# Patient Record
Sex: FEMALE | Race: BLACK OR AFRICAN AMERICAN | NOT HISPANIC OR LATINO | Employment: FULL TIME | URBAN - METROPOLITAN AREA
[De-identification: names, ages, dates, MRNs, and addresses within clinical notes are randomized per-mention and may not be internally consistent; named-entity substitution may affect disease eponyms.]

---

## 2024-02-29 ENCOUNTER — APPOINTMENT (OUTPATIENT)
Dept: RADIOLOGY | Facility: HOSPITAL | Age: 39
End: 2024-02-29
Payer: COMMERCIAL

## 2024-02-29 ENCOUNTER — HOSPITAL ENCOUNTER (EMERGENCY)
Facility: HOSPITAL | Age: 39
Discharge: HOME | End: 2024-02-29
Payer: COMMERCIAL

## 2024-02-29 VITALS
HEART RATE: 94 BPM | OXYGEN SATURATION: 97 % | TEMPERATURE: 97.3 F | BODY MASS INDEX: 33.32 KG/M2 | SYSTOLIC BLOOD PRESSURE: 120 MMHG | RESPIRATION RATE: 16 BRPM | HEIGHT: 65 IN | WEIGHT: 200 LBS | DIASTOLIC BLOOD PRESSURE: 89 MMHG

## 2024-02-29 DIAGNOSIS — J10.1 INFLUENZA A: Primary | ICD-10-CM

## 2024-02-29 LAB
ALBUMIN SERPL BCP-MCNC: 4.4 G/DL (ref 3.4–5)
ALP SERPL-CCNC: 72 U/L (ref 33–110)
ALT SERPL W P-5'-P-CCNC: 12 U/L (ref 7–45)
ANION GAP SERPL CALC-SCNC: 14 MMOL/L (ref 10–20)
AST SERPL W P-5'-P-CCNC: 19 U/L (ref 9–39)
BASOPHILS # BLD AUTO: 0.04 X10*3/UL (ref 0–0.1)
BASOPHILS NFR BLD AUTO: 0.7 %
BILIRUB SERPL-MCNC: 0.4 MG/DL (ref 0–1.2)
BUN SERPL-MCNC: 13 MG/DL (ref 6–23)
CALCIUM SERPL-MCNC: 9.7 MG/DL (ref 8.6–10.6)
CHLORIDE SERPL-SCNC: 99 MMOL/L (ref 98–107)
CO2 SERPL-SCNC: 26 MMOL/L (ref 21–32)
CREAT SERPL-MCNC: 0.97 MG/DL (ref 0.5–1.05)
EGFRCR SERPLBLD CKD-EPI 2021: 76 ML/MIN/1.73M*2
EOSINOPHIL # BLD AUTO: 0.12 X10*3/UL (ref 0–0.7)
EOSINOPHIL NFR BLD AUTO: 2.1 %
ERYTHROCYTE [DISTWIDTH] IN BLOOD BY AUTOMATED COUNT: 13.2 % (ref 11.5–14.5)
FLUAV RNA RESP QL NAA+PROBE: DETECTED
FLUBV RNA RESP QL NAA+PROBE: NOT DETECTED
GLUCOSE SERPL-MCNC: 141 MG/DL (ref 74–99)
HCT VFR BLD AUTO: 44 % (ref 36–46)
HGB BLD-MCNC: 14.9 G/DL (ref 12–16)
IMM GRANULOCYTES # BLD AUTO: 0.02 X10*3/UL (ref 0–0.7)
IMM GRANULOCYTES NFR BLD AUTO: 0.3 % (ref 0–0.9)
LYMPHOCYTES # BLD AUTO: 0.78 X10*3/UL (ref 1.2–4.8)
LYMPHOCYTES NFR BLD AUTO: 13.5 %
MCH RBC QN AUTO: 29.4 PG (ref 26–34)
MCHC RBC AUTO-ENTMCNC: 33.9 G/DL (ref 32–36)
MCV RBC AUTO: 87 FL (ref 80–100)
MONOCYTES # BLD AUTO: 0.86 X10*3/UL (ref 0.1–1)
MONOCYTES NFR BLD AUTO: 14.9 %
NEUTROPHILS # BLD AUTO: 3.97 X10*3/UL (ref 1.2–7.7)
NEUTROPHILS NFR BLD AUTO: 68.5 %
NRBC BLD-RTO: 0 /100 WBCS (ref 0–0)
PLATELET # BLD AUTO: 192 X10*3/UL (ref 150–450)
POTASSIUM SERPL-SCNC: 4 MMOL/L (ref 3.5–5.3)
PREGNANCY TEST URINE, POC: NEGATIVE
PROT SERPL-MCNC: 8.2 G/DL (ref 6.4–8.2)
RBC # BLD AUTO: 5.06 X10*6/UL (ref 4–5.2)
S PYO DNA THROAT QL NAA+PROBE: NOT DETECTED
SARS-COV-2 RNA RESP QL NAA+PROBE: NOT DETECTED
SODIUM SERPL-SCNC: 135 MMOL/L (ref 136–145)
WBC # BLD AUTO: 5.8 X10*3/UL (ref 4.4–11.3)

## 2024-02-29 PROCEDURE — 81025 URINE PREGNANCY TEST: CPT

## 2024-02-29 PROCEDURE — 2500000004 HC RX 250 GENERAL PHARMACY W/ HCPCS (ALT 636 FOR OP/ED)

## 2024-02-29 PROCEDURE — 80053 COMPREHEN METABOLIC PANEL: CPT

## 2024-02-29 PROCEDURE — 96361 HYDRATE IV INFUSION ADD-ON: CPT

## 2024-02-29 PROCEDURE — 71046 X-RAY EXAM CHEST 2 VIEWS: CPT | Performed by: RADIOLOGY

## 2024-02-29 PROCEDURE — 71046 X-RAY EXAM CHEST 2 VIEWS: CPT

## 2024-02-29 PROCEDURE — 87636 SARSCOV2 & INF A&B AMP PRB: CPT

## 2024-02-29 PROCEDURE — 85025 COMPLETE CBC W/AUTO DIFF WBC: CPT

## 2024-02-29 PROCEDURE — 96374 THER/PROPH/DIAG INJ IV PUSH: CPT

## 2024-02-29 PROCEDURE — 99284 EMERGENCY DEPT VISIT MOD MDM: CPT

## 2024-02-29 PROCEDURE — 87651 STREP A DNA AMP PROBE: CPT

## 2024-02-29 PROCEDURE — 36415 COLL VENOUS BLD VENIPUNCTURE: CPT

## 2024-02-29 RX ORDER — KETOROLAC TROMETHAMINE 15 MG/ML
INJECTION, SOLUTION INTRAMUSCULAR; INTRAVENOUS
Status: COMPLETED
Start: 2024-02-29 | End: 2024-02-29

## 2024-02-29 RX ORDER — KETOROLAC TROMETHAMINE 15 MG/ML
15 INJECTION, SOLUTION INTRAMUSCULAR; INTRAVENOUS ONCE
Status: COMPLETED | OUTPATIENT
Start: 2024-02-29 | End: 2024-02-29

## 2024-02-29 RX ORDER — OSELTAMIVIR PHOSPHATE 75 MG/1
75 CAPSULE ORAL EVERY 12 HOURS
Qty: 10 CAPSULE | Refills: 0 | Status: SHIPPED | OUTPATIENT
Start: 2024-02-29 | End: 2024-03-05

## 2024-02-29 RX ADMIN — KETOROLAC TROMETHAMINE 15 MG: 15 INJECTION, SOLUTION INTRAMUSCULAR; INTRAVENOUS at 09:17

## 2024-02-29 RX ADMIN — SODIUM CHLORIDE 1000 ML: 9 INJECTION, SOLUTION INTRAVENOUS at 09:23

## 2024-02-29 ASSESSMENT — PAIN DESCRIPTION - LOCATION: LOCATION: GENERALIZED

## 2024-02-29 ASSESSMENT — COLUMBIA-SUICIDE SEVERITY RATING SCALE - C-SSRS
6. HAVE YOU EVER DONE ANYTHING, STARTED TO DO ANYTHING, OR PREPARED TO DO ANYTHING TO END YOUR LIFE?: NO
2. HAVE YOU ACTUALLY HAD ANY THOUGHTS OF KILLING YOURSELF?: NO
1. IN THE PAST MONTH, HAVE YOU WISHED YOU WERE DEAD OR WISHED YOU COULD GO TO SLEEP AND NOT WAKE UP?: NO

## 2024-02-29 ASSESSMENT — PAIN - FUNCTIONAL ASSESSMENT: PAIN_FUNCTIONAL_ASSESSMENT: 0-10

## 2024-02-29 ASSESSMENT — PAIN SCALES - GENERAL: PAINLEVEL_OUTOF10: 8

## 2024-02-29 NOTE — DISCHARGE INSTRUCTIONS
You have the flu. I am sending in a prescription for you to help treat the flu. Please drink plenty of fluids as the flu can dehydrate you. Your chest Xray was negative and strep swab was negative. Your blood work we took is showing no signs of dehydration yet.

## 2024-02-29 NOTE — Clinical Note
Silvia Tamayo was seen and treated in our emergency department on 2/29/2024.  She may return to work on 03/04/2024.       If you have any questions or concerns, please don't hesitate to call.      Manuela Cheek PA-C

## 2024-02-29 NOTE — ED PROVIDER NOTES
HPI   Chief Complaint   Patient presents with    Generalized Body Aches       38-year-old female presenting today for flulike symptoms.  For the past few days, experiencing symptoms including headache, productive cough, body aches.  Endorsing some facial congestion.  No ear pain but notes a sore throat.  Endorsing a productive cough but no shortness of breath or chest pain.  No nausea, vomiting, abdominal pain or diarrhea.  No sick contacts.  Reports the body aches are rated an 8 out of 10 on pain scale.                          [place] Coma Scale Score: 15                     Patient History   History reviewed. No pertinent past medical history.  History reviewed. No pertinent surgical history.  No family history on file.  Social History     Tobacco Use    Smoking status: Never    Smokeless tobacco: Never   Substance Use Topics    Alcohol use: Not on file    Drug use: Not on file       Physical Exam   ED Triage Vitals [02/29/24 0823]   Temperature Heart Rate Respirations BP   36.3 °C (97.3 °F) (!) 122 18 120/89      Pulse Ox Temp src Heart Rate Source Patient Position   95 % -- -- --      BP Location FiO2 (%)     -- --       Physical Exam  Vitals and nursing note reviewed.   Constitutional:       General: She is not in acute distress.     Appearance: Normal appearance. She is not ill-appearing.   HENT:      Head: Normocephalic and atraumatic.      Right Ear: Hearing, tympanic membrane, ear canal and external ear normal. Tympanic membrane is not erythematous or bulging.      Left Ear: Hearing, tympanic membrane, ear canal and external ear normal. Tympanic membrane is not erythematous or bulging.      Nose: Nose normal. No congestion or rhinorrhea.      Right Turbinates: Not swollen or pale.      Left Turbinates: Not swollen or pale.      Right Sinus: No maxillary sinus tenderness or frontal sinus tenderness.      Left Sinus: No maxillary sinus tenderness or frontal sinus tenderness.      Mouth/Throat:      Mouth:  Mucous membranes are moist.      Pharynx: Oropharynx is clear. Uvula midline. No pharyngeal swelling, oropharyngeal exudate, posterior oropharyngeal erythema or uvula swelling.      Tonsils: No tonsillar exudate or tonsillar abscesses. 0 on the right. 0 on the left.   Eyes:      Extraocular Movements: Extraocular movements intact.      Conjunctiva/sclera: Conjunctivae normal.      Pupils: Pupils are equal, round, and reactive to light.   Cardiovascular:      Rate and Rhythm: Normal rate and regular rhythm.      Heart sounds: Normal heart sounds.   Pulmonary:      Effort: No accessory muscle usage or respiratory distress.      Breath sounds: Normal breath sounds. No wheezing, rhonchi or rales.   Abdominal:      General: Abdomen is flat. Bowel sounds are normal. There is no distension.      Palpations: Abdomen is soft.      Tenderness: There is no abdominal tenderness. There is no right CVA tenderness or left CVA tenderness.   Musculoskeletal:         General: No swelling or deformity. Normal range of motion.      Cervical back: Normal range of motion and neck supple.      Right lower leg: No edema.      Left lower leg: No edema.   Skin:     General: Skin is warm and dry.      Capillary Refill: Capillary refill takes less than 2 seconds.   Neurological:      General: No focal deficit present.      Mental Status: She is alert and oriented to person, place, and time.      GCS: GCS eye subscore is 4. GCS verbal subscore is 5. GCS motor subscore is 6.      Cranial Nerves: Cranial nerves 2-12 are intact.      Sensory: No sensory deficit.      Motor: Motor function is intact. No weakness.   Psychiatric:         Mood and Affect: Mood and affect normal.         Speech: Speech normal.         Behavior: Behavior normal. Behavior is cooperative.         ED Course & MDM   ED Course as of 02/29/24 1102   Thu Feb 29, 2024   0920 Preg Test, Ur: Negative [ML]   0955 Flu A Result(!): Detected [ML]   0956 XR chest 2 views  1. Blunting  of the right costophrenic angle which may represent small  pleural effusion. Correlate with risk factors.  2. No focal pulmonary consolidation. [ML]   1014 Comprehensive metabolic panel(!)  Mildly decreased sodium at 135.  No other electrolyte abnormalities, RONNELL or elevated LFTs [ML]   1041 Group A Strep PCR: Not Detected [ML]   1041 CBC and Auto Differential(!)  No leukocytosis or acute anemia [ML]      ED Course User Index  [ML] Manuela Cheek PA-C         Diagnoses as of 02/29/24 1102   Influenza A       Medical Decision Making  38-year-old female presenting today for flulike symptoms.  HEENT exam benign.  Mildly erythematous oropharynx without any notable exudates.  I did proceed and get a strep swab which came back negative.  TMs bilaterally without any erythema or bulging.  Lungs clear to auscultation without any adventitious lung sounds.  Considering her productive cough with the notable mild hypoxia of 95%, I did get a chest x-ray which shows no signs of pneumonia, though a very small right-sided pleural effusion.  Will defer antibiotic treatment at this time as I have low concerns for pneumonia.  Came back positive for flu A.  Patient was given Toradol and IV fluids as when she came in she had a heart rate of 122, could be due to dehydration.  It did resolve after patient was given IV fluids and heart rate went down to 94.  No fever noted.  No signs of SIRS/sepsis.  I also got CBC and CMP which came back negative.  Patient feels much better, and home-going.  Instructed patient on symptomatic control.  Considering the notable small pleural effusion, will send her home with Tamiflu in order to prevent a viral pneumonia.  Instructed to follow-up with PCP and come back to the ER if symptoms worsen.        Procedure  Procedures     Manuela Cheek PA-C  02/29/24 1117       Manuela Cheek PA-C  02/29/24 1118

## 2024-08-16 ENCOUNTER — CLINICAL SUPPORT (OUTPATIENT)
Dept: EMERGENCY MEDICINE | Facility: HOSPITAL | Age: 39
End: 2024-08-16
Payer: MEDICAID

## 2024-08-16 ENCOUNTER — APPOINTMENT (OUTPATIENT)
Dept: RADIOLOGY | Facility: HOSPITAL | Age: 39
End: 2024-08-16
Payer: MEDICAID

## 2024-08-16 ENCOUNTER — HOSPITAL ENCOUNTER (EMERGENCY)
Facility: HOSPITAL | Age: 39
Discharge: HOME | End: 2024-08-16
Payer: MEDICAID

## 2024-08-16 VITALS
DIASTOLIC BLOOD PRESSURE: 89 MMHG | BODY MASS INDEX: 33.32 KG/M2 | WEIGHT: 200 LBS | OXYGEN SATURATION: 98 % | HEART RATE: 74 BPM | TEMPERATURE: 97.6 F | HEIGHT: 65 IN | SYSTOLIC BLOOD PRESSURE: 140 MMHG | RESPIRATION RATE: 16 BRPM

## 2024-08-16 DIAGNOSIS — M25.512 ACUTE PAIN OF LEFT SHOULDER: Primary | ICD-10-CM

## 2024-08-16 LAB
ATRIAL RATE: 74 BPM
P AXIS: 80 DEGREES
P OFFSET: 215 MS
P ONSET: 164 MS
PR INTERVAL: 120 MS
Q ONSET: 224 MS
QRS COUNT: 12 BEATS
QRS DURATION: 68 MS
QT INTERVAL: 354 MS
QTC CALCULATION(BAZETT): 392 MS
QTC FREDERICIA: 379 MS
R AXIS: 62 DEGREES
T AXIS: 33 DEGREES
T OFFSET: 401 MS
VENTRICULAR RATE: 74 BPM

## 2024-08-16 PROCEDURE — 73030 X-RAY EXAM OF SHOULDER: CPT | Mod: LT

## 2024-08-16 PROCEDURE — 93010 ELECTROCARDIOGRAM REPORT: CPT

## 2024-08-16 PROCEDURE — 2500000001 HC RX 250 WO HCPCS SELF ADMINISTERED DRUGS (ALT 637 FOR MEDICARE OP): Mod: SE

## 2024-08-16 PROCEDURE — 99284 EMERGENCY DEPT VISIT MOD MDM: CPT

## 2024-08-16 PROCEDURE — 2500000005 HC RX 250 GENERAL PHARMACY W/O HCPCS: Mod: SE

## 2024-08-16 PROCEDURE — 73060 X-RAY EXAM OF HUMERUS: CPT | Mod: LEFT SIDE | Performed by: STUDENT IN AN ORGANIZED HEALTH CARE EDUCATION/TRAINING PROGRAM

## 2024-08-16 PROCEDURE — 73030 X-RAY EXAM OF SHOULDER: CPT | Mod: LEFT SIDE | Performed by: STUDENT IN AN ORGANIZED HEALTH CARE EDUCATION/TRAINING PROGRAM

## 2024-08-16 PROCEDURE — 93005 ELECTROCARDIOGRAM TRACING: CPT

## 2024-08-16 PROCEDURE — 73060 X-RAY EXAM OF HUMERUS: CPT | Mod: LT

## 2024-08-16 RX ORDER — LIDOCAINE 560 MG/1
1 PATCH PERCUTANEOUS; TOPICAL; TRANSDERMAL ONCE
Status: DISCONTINUED | OUTPATIENT
Start: 2024-08-16 | End: 2024-08-16 | Stop reason: HOSPADM

## 2024-08-16 RX ORDER — CYCLOBENZAPRINE HCL 10 MG
5 TABLET ORAL ONCE
Status: COMPLETED | OUTPATIENT
Start: 2024-08-16 | End: 2024-08-16

## 2024-08-16 RX ORDER — ACETAMINOPHEN 500 MG
500 TABLET ORAL EVERY 6 HOURS PRN
Qty: 28 TABLET | Refills: 0 | Status: SHIPPED | OUTPATIENT
Start: 2024-08-16 | End: 2024-08-23

## 2024-08-16 RX ORDER — LIDOCAINE 50 MG/G
1 PATCH TOPICAL DAILY
Qty: 5 PATCH | Refills: 0 | Status: SHIPPED | OUTPATIENT
Start: 2024-08-16

## 2024-08-16 RX ORDER — CYCLOBENZAPRINE HCL 5 MG
5 TABLET ORAL 3 TIMES DAILY PRN
Qty: 21 TABLET | Refills: 0 | Status: SHIPPED | OUTPATIENT
Start: 2024-08-16 | End: 2024-08-23

## 2024-08-16 RX ORDER — ACETAMINOPHEN 325 MG/1
975 TABLET ORAL ONCE
Status: COMPLETED | OUTPATIENT
Start: 2024-08-16 | End: 2024-08-16

## 2024-08-16 ASSESSMENT — COLUMBIA-SUICIDE SEVERITY RATING SCALE - C-SSRS
6. HAVE YOU EVER DONE ANYTHING, STARTED TO DO ANYTHING, OR PREPARED TO DO ANYTHING TO END YOUR LIFE?: NO
1. IN THE PAST MONTH, HAVE YOU WISHED YOU WERE DEAD OR WISHED YOU COULD GO TO SLEEP AND NOT WAKE UP?: NO
2. HAVE YOU ACTUALLY HAD ANY THOUGHTS OF KILLING YOURSELF?: NO

## 2024-08-16 NOTE — ED PROVIDER NOTES
HPI   Chief Complaint   Patient presents with    Shoulder Pain    Neck Pain   This is a 39-year-old female who denies any significant past medical history who presents to the ED with shoulder pain. 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  She has been experiencing throbbing pains in her left shoulder that radiate into the back of her upper arm, rated 9/10.  Denies any aggravating or alleviating factors.  She states that her symptoms began 2 days ago after she came home from work. No elbow or wrist involvement.  No falls or direct blows.  She has not taken anything OTC for her symptoms.     Denies any fevers, chills, chest pain, shortness of breath, abdominal pain, N/V/D    Limitations to history: None  Independent Historians: Patient  External Records Reviewed: None    Patient History   History reviewed. No pertinent past medical history.  History reviewed. No pertinent surgical history.  No family history on file.  Social History     Tobacco Use    Smoking status: Never    Smokeless tobacco: Never   Substance Use Topics    Alcohol use: Not on file    Drug use: Not on file       Physical Exam   ED Triage Vitals [08/16/24 1002]   Temperature Heart Rate Respirations BP   36.4 °C (97.6 °F) 74 16 140/89      Pulse Ox Temp Source Heart Rate Source Patient Position   98 % Temporal -- --      BP Location FiO2 (%)     -- --       Physical Exam  Constitutional:       General: She is not in acute distress.     Appearance: She is not ill-appearing.   Cardiovascular:      Rate and Rhythm: Normal rate and regular rhythm.      Pulses:           Radial pulses are 2+ on the right side and 2+ on the left side.      Heart sounds: Normal heart sounds.   Pulmonary:      Effort: Pulmonary effort is normal. No respiratory distress.      Breath sounds: Normal breath sounds.   Abdominal:      General: Bowel sounds are normal.      Palpations: Abdomen is soft.      Tenderness:  There is no abdominal tenderness.   Musculoskeletal:         General: No deformity or signs of injury.      Right shoulder: Normal.      Left shoulder: No swelling, deformity, effusion or bony tenderness. Normal range of motion.      Cervical back: Normal range of motion and neck supple. No pain with movement or spinous process tenderness.      Thoracic back: No bony tenderness. Normal range of motion.      Lumbar back: No bony tenderness. Normal range of motion.   Skin:     General: Skin is warm and dry.      Capillary Refill: Capillary refill takes less than 2 seconds.      Findings: No bruising or erythema.   Neurological:      General: No focal deficit present.      Mental Status: She is alert and oriented to person, place, and time.      Gait: Gait normal.       ED Course & MDM   ED Course as of 08/16/24 1441   Fri Aug 16, 2024   1003 ECG 12 lead  EKG shows HR 74 with sinus rhythm, normal axis, NV interval 120, QRS duration 68.   Normal ST and T wave pattern with no evidence of STEMI or acute ischemia  [LH]      ED Course User Index  [LH] Charu Gomez PA-C         Diagnoses as of 08/16/24 1441   Acute pain of left shoulder       Medical Decision Making  This is a 39-year-old female who denies any significant past medical history who presents to the ED with shoulder pain. 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  She has been experiencing throbbing pains in her left shoulder that radiate into the back of her upper arm, rated 9/10.    On physical exam, patient is sitting up comfortably and is overall well-appearing.  She endorses generalized tenderness in her left shoulder, no swelling, effusions, or bony tenderness.  ROM is intact and nonpainful in the left shoulder, elbow and wrist.  Overlying skin is warm and appears well-perfused, no erythema or ecchymosis. Radial pulses are strong with normal cap refill in all digits. Patient is freely moving  all extremities.     Administered Tylenol Flexeril and a lidocaine patch for pain. EKG shows no evidence of STEMI or acute ischemia, she denies any significant risk factors, reassuring for no ACS. No evidence of acute infection or neurovascular compromise based on benign physical exam findings.  X-rays of the shoulder and humerus showed no acute fractures or misalignments.  On reassessment, patient states that her pain has slightly improved.  Provided prescriptions for analgesics and muscle relaxants.  Counseled patient to follow-up with her PCP.  Discussed ED return criteria.  Patient verbalized understanding and was agreeable to plan of care.  Discharged in stable condition.       XR shoulder left 2+ views   Final Result   Unremarkable radiographs of the left shoulder and humerus.        MACRO:   None        Signed by: Sheryl Gil 8/16/2024 3:16 PM   Dictation workstation:   [id number]      XR humerus left   Final Result   Unremarkable radiographs of the left shoulder and humerus.        MACRO:   None        Signed by: Sheryl Gil 8/16/2024 3:16 PM   Dictation workstation:   [id number]            Charu Gomez PA-C  08/16/24 5603

## 2024-08-16 NOTE — Clinical Note
Silvia Tamayo was seen and treated in our emergency department on 8/16/2024.  She may return to work on 08/19/2024.       If you have any questions or concerns, please don't hesitate to call.      Charu Gomez PA-C

## 2024-08-16 NOTE — ED TRIAGE NOTES
Pt presents to the ED c/o neck and L shoulder pain that radiates to her elbow. 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 Denies pmhx.

## 2024-09-28 ENCOUNTER — HOSPITAL ENCOUNTER (EMERGENCY)
Facility: HOSPITAL | Age: 39
Discharge: HOME | End: 2024-09-28
Payer: MEDICAID

## 2024-09-28 VITALS
WEIGHT: 200 LBS | BODY MASS INDEX: 33.32 KG/M2 | HEART RATE: 83 BPM | OXYGEN SATURATION: 95 % | DIASTOLIC BLOOD PRESSURE: 96 MMHG | TEMPERATURE: 97.7 F | HEIGHT: 65 IN | SYSTOLIC BLOOD PRESSURE: 149 MMHG | RESPIRATION RATE: 16 BRPM

## 2024-09-28 DIAGNOSIS — J06.9 VIRAL URI WITH COUGH: Primary | ICD-10-CM

## 2024-09-28 LAB
FLUAV RNA RESP QL NAA+PROBE: NOT DETECTED
FLUBV RNA RESP QL NAA+PROBE: NOT DETECTED
PREGNANCY TEST URINE, POC: NEGATIVE
SARS-COV-2 RNA RESP QL NAA+PROBE: NOT DETECTED

## 2024-09-28 PROCEDURE — 2500000004 HC RX 250 GENERAL PHARMACY W/ HCPCS (ALT 636 FOR OP/ED): Mod: SE

## 2024-09-28 PROCEDURE — 87636 SARSCOV2 & INF A&B AMP PRB: CPT

## 2024-09-28 PROCEDURE — 96372 THER/PROPH/DIAG INJ SC/IM: CPT

## 2024-09-28 PROCEDURE — 81025 URINE PREGNANCY TEST: CPT

## 2024-09-28 PROCEDURE — 99283 EMERGENCY DEPT VISIT LOW MDM: CPT

## 2024-09-28 PROCEDURE — 99284 EMERGENCY DEPT VISIT MOD MDM: CPT

## 2024-09-28 RX ORDER — KETOROLAC TROMETHAMINE 30 MG/ML
30 INJECTION, SOLUTION INTRAMUSCULAR; INTRAVENOUS ONCE
Status: COMPLETED | OUTPATIENT
Start: 2024-09-28 | End: 2024-09-28

## 2024-09-28 RX ADMIN — KETOROLAC TROMETHAMINE 30 MG: 30 INJECTION, SOLUTION INTRAMUSCULAR; INTRAVENOUS at 08:17

## 2024-09-28 ASSESSMENT — PAIN SCALES - GENERAL
PAINLEVEL_OUTOF10: 6
PAINLEVEL_OUTOF10: 9
PAINLEVEL_OUTOF10: 0 - NO PAIN

## 2024-09-28 ASSESSMENT — COLUMBIA-SUICIDE SEVERITY RATING SCALE - C-SSRS
2. HAVE YOU ACTUALLY HAD ANY THOUGHTS OF KILLING YOURSELF?: NO
6. HAVE YOU EVER DONE ANYTHING, STARTED TO DO ANYTHING, OR PREPARED TO DO ANYTHING TO END YOUR LIFE?: NO
1. IN THE PAST MONTH, HAVE YOU WISHED YOU WERE DEAD OR WISHED YOU COULD GO TO SLEEP AND NOT WAKE UP?: NO

## 2024-09-28 ASSESSMENT — PAIN DESCRIPTION - LOCATION: LOCATION: HEAD

## 2024-09-28 ASSESSMENT — PAIN - FUNCTIONAL ASSESSMENT: PAIN_FUNCTIONAL_ASSESSMENT: 0-10

## 2024-09-28 NOTE — DISCHARGE INSTRUCTIONS
COVID and flu are negative.  Likely viral illness.  Symptomatic control with fluids, ibuprofen or Tylenol for body aches/fever.  Follow-up with primary care if symptoms worsen.

## 2024-09-28 NOTE — ED PROVIDER NOTES
HPI   Chief Complaint   Patient presents with    Flu Symptoms       39-year-old female with no significant past medical history presenting today for flulike symptoms.  Reports that symptoms started yesterday.  Endorses a headache, nonproductive cough, body aches and mild nasal congestion.  Denies ear pain though does admit to a mild sore throat.  Having no difficulty controlling secretions.  Denies any chest pain or shortness of breath.  Denies any GI symptoms including nausea vomiting diarrhea or abdominal pain.  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  Reports body aches but no documented fever.  Has not taken any medication for symptoms at home.              Patient History   History reviewed. No pertinent past medical history.  History reviewed. No pertinent surgical history.  No family history on file.  Social History     Tobacco Use    Smoking status: Never    Smokeless tobacco: Never   Substance Use Topics    Alcohol use: Not on file    Drug use: Not on file       Physical Exam   ED Triage Vitals [09/28/24 0745]   Temperature Heart Rate Respirations BP   36.5 °C (97.7 °F) 83 16 (!) 149/96      Pulse Ox Temp src Heart Rate Source Patient Position   95 % -- -- --      BP Location FiO2 (%)     -- --       Physical Exam  Vitals and nursing note reviewed.   Constitutional:       General: She is not in acute distress.     Appearance: Normal appearance. She is not ill-appearing.   HENT:      Head: Normocephalic and atraumatic.      Right Ear: Hearing, tympanic membrane, ear canal and external ear normal. Tympanic membrane is not erythematous or bulging.      Left Ear: Hearing, tympanic membrane, ear canal and external ear normal. Tympanic membrane is not erythematous or bulging.      Nose: Nose normal. No congestion or rhinorrhea.      Right Turbinates: Not swollen or pale.      Left Turbinates: Not swollen or pale.      Right Sinus: No maxillary sinus  tenderness or frontal sinus tenderness.      Left Sinus: No maxillary sinus tenderness or frontal sinus tenderness.      Mouth/Throat:      Mouth: Mucous membranes are moist.      Pharynx: Oropharynx is clear. Uvula midline. No pharyngeal swelling, oropharyngeal exudate, posterior oropharyngeal erythema or uvula swelling.      Tonsils: No tonsillar exudate or tonsillar abscesses. 0 on the right. 0 on the left.   Eyes:      Extraocular Movements: Extraocular movements intact.      Conjunctiva/sclera: Conjunctivae normal.      Pupils: Pupils are equal, round, and reactive to light.   Cardiovascular:      Rate and Rhythm: Normal rate and regular rhythm.      Heart sounds: Normal heart sounds.   Pulmonary:      Effort: No accessory muscle usage or respiratory distress.      Breath sounds: Normal breath sounds. No wheezing, rhonchi or rales.   Abdominal:      General: Abdomen is flat. Bowel sounds are normal. There is no distension.      Palpations: Abdomen is soft.      Tenderness: There is no abdominal tenderness. There is no right CVA tenderness or left CVA tenderness.   Musculoskeletal:         General: No swelling or deformity. Normal range of motion.      Cervical back: Normal range of motion and neck supple.      Right lower leg: No edema.      Left lower leg: No edema.   Skin:     General: Skin is warm and dry.      Capillary Refill: Capillary refill takes less than 2 seconds.   Neurological:      General: No focal deficit present.      Mental Status: She is alert and oriented to person, place, and time.      GCS: GCS eye subscore is 4. GCS verbal subscore is 5. GCS motor subscore is 6.      Cranial Nerves: Cranial nerves 2-12 are intact.      Sensory: No sensory deficit.      Motor: Motor function is intact. No weakness.   Psychiatric:         Mood and Affect: Mood and affect normal.         Speech: Speech normal.         Behavior: Behavior normal. Behavior is cooperative.           ED Course & MDM   ED Course  as of 09/28/24 0959   Sat Sep 28, 2024   0826 Preg Test, Ur: Negative [ML]   0916 Sars-CoV-2 PCR [ML]   0916 Influenza A, and B PCR [ML]      ED Course User Index  [ML] Manuela Cheek PA-C         Diagnoses as of 09/28/24 0959   Viral URI with cough                 No data recorded     [place] Coma Scale Score: 15 (09/28/24 0747 : Bryson Biswas RN)                           Medical Decision Making  39-year-old female presenting today for flulike symptoms.  Endorsing headache, mild sore throat, nonproductive cough, and bodyaches.  On arrival, patient is afebrile.  Oropharynx without erythema or noted exudates/tonsillar swelling.  TMs bilaterally without erythema or bulging.  No pain on palpation of the sinuses.  Lungs clear to auscultation without any adventitious lung sounds.  I have low suspicion for pneumonia.  Denies any GI complaints.  Symptoms consistent with viral URI.  Will give patient dose of Toradol for her body aches and headache, swab for COVID and flu    COVID and flu negative.  Discussed symptomatic control at home and return precautions.        Procedure  Procedures     Manuela Cheek PA-C  09/28/24 0959

## 2024-09-28 NOTE — Clinical Note
Silvia Tamayo was seen and treated in our emergency department on 9/28/2024.  She may return to work on 10/02/2024.       If you have any questions or concerns, please don't hesitate to call.      Manuela Cheek PA-C

## 2024-10-26 ENCOUNTER — HOSPITAL ENCOUNTER (EMERGENCY)
Facility: HOSPITAL | Age: 39
Discharge: HOME | End: 2024-10-26
Payer: MEDICAID

## 2024-10-26 VITALS
HEART RATE: 71 BPM | DIASTOLIC BLOOD PRESSURE: 88 MMHG | TEMPERATURE: 96.6 F | WEIGHT: 200 LBS | RESPIRATION RATE: 16 BRPM | HEIGHT: 65 IN | BODY MASS INDEX: 33.32 KG/M2 | SYSTOLIC BLOOD PRESSURE: 149 MMHG | OXYGEN SATURATION: 99 %

## 2024-10-26 DIAGNOSIS — J02.9 VIRAL PHARYNGITIS: ICD-10-CM

## 2024-10-26 DIAGNOSIS — B34.9 VIRAL INFECTION: Primary | ICD-10-CM

## 2024-10-26 LAB
FLUAV RNA RESP QL NAA+PROBE: NOT DETECTED
FLUBV RNA RESP QL NAA+PROBE: NOT DETECTED
S PYO DNA THROAT QL NAA+PROBE: NOT DETECTED
SARS-COV-2 RNA RESP QL NAA+PROBE: NOT DETECTED

## 2024-10-26 PROCEDURE — 87651 STREP A DNA AMP PROBE: CPT

## 2024-10-26 PROCEDURE — 87636 SARSCOV2 & INF A&B AMP PRB: CPT

## 2024-10-26 PROCEDURE — 2500000001 HC RX 250 WO HCPCS SELF ADMINISTERED DRUGS (ALT 637 FOR MEDICARE OP): Mod: SE

## 2024-10-26 PROCEDURE — 99283 EMERGENCY DEPT VISIT LOW MDM: CPT

## 2024-10-26 PROCEDURE — 99284 EMERGENCY DEPT VISIT MOD MDM: CPT

## 2024-10-26 RX ORDER — ACETAMINOPHEN 325 MG/1
650 TABLET ORAL ONCE
Status: COMPLETED | OUTPATIENT
Start: 2024-10-26 | End: 2024-10-26

## 2024-10-26 RX ADMIN — ACETAMINOPHEN 650 MG: 325 TABLET ORAL at 08:15

## 2024-10-26 ASSESSMENT — PAIN DESCRIPTION - LOCATION: LOCATION: THROAT

## 2024-10-26 ASSESSMENT — COLUMBIA-SUICIDE SEVERITY RATING SCALE - C-SSRS
1. IN THE PAST MONTH, HAVE YOU WISHED YOU WERE DEAD OR WISHED YOU COULD GO TO SLEEP AND NOT WAKE UP?: NO
2. HAVE YOU ACTUALLY HAD ANY THOUGHTS OF KILLING YOURSELF?: NO
6. HAVE YOU EVER DONE ANYTHING, STARTED TO DO ANYTHING, OR PREPARED TO DO ANYTHING TO END YOUR LIFE?: NO

## 2024-10-26 ASSESSMENT — PAIN DESCRIPTION - PAIN TYPE: TYPE: ACUTE PAIN

## 2024-10-26 ASSESSMENT — PAIN - FUNCTIONAL ASSESSMENT: PAIN_FUNCTIONAL_ASSESSMENT: 0-10

## 2024-10-26 ASSESSMENT — PAIN SCALES - GENERAL: PAINLEVEL_OUTOF10: 10 - WORST POSSIBLE PAIN

## 2024-10-26 NOTE — Clinical Note
Silvia Tamayo was seen and treated in our emergency department on 10/26/2024.  She may return to work on 10/28/2024.       If you have any questions or concerns, please don't hesitate to call.      Javed Mars PA-C

## 2024-10-26 NOTE — ED PROVIDER NOTES
HPI   Chief Complaint   Patient presents with    Flu Symptoms       HPI    Silvia Tamayo is a 39-year-old female with no significant medical history presents the ED with sore throat and bodyaches since last night.  Patient states she has been able to swallow and eat and drink.  Patient denies drooling.  Patient states she also has been having a dry cough and denies coughing up sputum.  States that she has a headache that began yesterday.  Patient states she took Tylenol for the headache and it did improve.  Patient states her current headache is less painful than it was yesterday.  Patient states she has not had similar headaches previously however this is not the worst headache of her life.  Patient states she has not been around anyone has been sick.  Patient denies chest pain, shortness of breath, abdominal pain, nausea, vomiting, fevers, chills, ear pain.    Patient History   No past medical history on file.  No past surgical history on file.  No family history on file.  Social History     Tobacco Use    Smoking status: Never    Smokeless tobacco: Never   Substance Use Topics    Alcohol use: Not on file    Drug use: Not on file       Physical Exam   ED Triage Vitals [10/26/24 0733]   Temperature Heart Rate Respirations BP   35.9 °C (96.6 °F) 71 16 149/88      Pulse Ox Temp Source Heart Rate Source Patient Position   99 % Temporal -- --      BP Location FiO2 (%)     -- --       Physical Exam  Vitals and nursing note reviewed.   Constitutional:       Appearance: Normal appearance. She is not ill-appearing.   HENT:      Head: Normocephalic and atraumatic.      Mouth/Throat:      Mouth: Mucous membranes are moist.      Pharynx: Oropharynx is clear. Uvula midline. Posterior oropharyngeal erythema present. No pharyngeal swelling or oropharyngeal exudate.      Tonsils: No tonsillar exudate or tonsillar abscesses. 0 on the right. 0 on the left.   Eyes:      General:         Right eye: No discharge.         Left eye:  No discharge.      Extraocular Movements: Extraocular movements intact.      Conjunctiva/sclera: Conjunctivae normal.      Pupils: Pupils are equal, round, and reactive to light.   Cardiovascular:      Rate and Rhythm: Normal rate and regular rhythm.      Heart sounds: Normal heart sounds. No murmur heard.     No gallop.   Pulmonary:      Effort: Pulmonary effort is normal. No respiratory distress.      Breath sounds: Normal breath sounds. No stridor. No wheezing, rhonchi or rales.   Abdominal:      General: Abdomen is flat. Bowel sounds are normal. There is no distension.      Palpations: Abdomen is soft. There is no mass.      Tenderness: There is no abdominal tenderness. There is no guarding or rebound.      Hernia: No hernia is present.   Musculoskeletal:      Right lower leg: No edema.      Left lower leg: No edema.   Skin:     General: Skin is warm and dry.   Neurological:      General: No focal deficit present.      Mental Status: She is alert and oriented to person, place, and time.      Cranial Nerves: Cranial nerves 2-12 are intact. No dysarthria or facial asymmetry.      Sensory: Sensation is intact.      Motor: Motor function is intact. No tremor or pronator drift.      Coordination: Coordination is intact. Coordination normal. Finger-Nose-Finger Test and Heel to Shin Test normal. Rapid alternating movements normal.      Gait: Gait is intact.   Psychiatric:         Mood and Affect: Mood normal.         Behavior: Behavior normal.           ED Course & MDM   Diagnoses as of 10/26/24 0939   Viral infection   Viral pharyngitis                 No data recorded     [place] Coma Scale Score: 15 (10/26/24 0734 : Nimesh Moss RN)                           Medical Decision Making  This is a 39-year-old female presenting the ED with sore throat, body aches, headache since last night.  Patient is sitting comfortably and is in no respiratory distress.  No muffled voice or drooling noted on exam.  Posterior  oropharynx is erythematous.  No tonsillar exudates or swelling.  No peritonsillar abscess bilaterally.  Strep swab was obtained today with concern for strep pharyngitis.  Strep test is negative.  COVID and influenza swabs were also obtained to determine etiology the patient's symptoms.  Patient has been having a dry cough and lungs are clear to auscultation bilaterally.  There is low suspicion for pneumonia at this time therefore chest x-ray was not obtained.  Patient states she has been having a headache since yesterday that did improve with Tylenol.  Patient states this is not the worst headache of her life.  Neuroexam is unremarkable.  There is low suspicion for intracranial abnormalities such as intracranial hemorrhage, hematoma, subarachnoid hemorrhage, mass.  Patient was given Tylenol for headache.  COVID and influenza swabs are negative.  Discussed with patient the most likely cause of her symptoms is from a viral infection and viral pharyngitis.  Patient has been hemodynamically stable in the emergency department today.    Disposition: Discharge home  Patient was advised to follow-up with her primary care provider.  Strict return precautions were given.  Plan was discussed with the patient and the patient understands and agrees.  Patient was discharged in stable condition.  Patient was provided with work note.    Procedure  Procedures     Javed Mars PA-C  10/26/24 0940

## 2024-10-26 NOTE — DISCHARGE INSTRUCTIONS
Please follow-up with your primary care provider as needed.  Return to the emergency department if your symptoms persist or worsen or new symptoms began.

## 2024-10-26 NOTE — ED TRIAGE NOTES
Pt presented to ED for c/o sore throat and body aches that started that last night.  No CP or SOB. Pt talking in full sentences. Pt educated on use of ED vs urgent care. Pt has had multiple visits for same complaints within the past 6 months.

## 2025-05-09 ENCOUNTER — HOSPITAL ENCOUNTER (EMERGENCY)
Facility: HOSPITAL | Age: 40
Discharge: HOME | End: 2025-05-09
Payer: MEDICAID

## 2025-05-09 VITALS
WEIGHT: 200 LBS | HEIGHT: 65 IN | SYSTOLIC BLOOD PRESSURE: 159 MMHG | TEMPERATURE: 98.4 F | HEART RATE: 67 BPM | RESPIRATION RATE: 16 BRPM | DIASTOLIC BLOOD PRESSURE: 107 MMHG | BODY MASS INDEX: 33.32 KG/M2 | OXYGEN SATURATION: 98 %

## 2025-05-09 DIAGNOSIS — R52 BODY ACHES: Primary | ICD-10-CM

## 2025-05-09 PROCEDURE — 99283 EMERGENCY DEPT VISIT LOW MDM: CPT | Performed by: PHYSICIAN ASSISTANT

## 2025-05-09 PROCEDURE — 99282 EMERGENCY DEPT VISIT SF MDM: CPT

## 2025-05-09 RX ORDER — ACETAMINOPHEN 500 MG
1000 TABLET ORAL EVERY 6 HOURS PRN
Qty: 30 TABLET | Refills: 0 | Status: SHIPPED | OUTPATIENT
Start: 2025-05-09 | End: 2025-05-19

## 2025-05-09 ASSESSMENT — PAIN - FUNCTIONAL ASSESSMENT: PAIN_FUNCTIONAL_ASSESSMENT: 0-10

## 2025-05-09 ASSESSMENT — PAIN DESCRIPTION - LOCATION: LOCATION: HEAD

## 2025-05-09 ASSESSMENT — LIFESTYLE VARIABLES
TOTAL SCORE: 0
EVER HAD A DRINK FIRST THING IN THE MORNING TO STEADY YOUR NERVES TO GET RID OF A HANGOVER: NO
EVER FELT BAD OR GUILTY ABOUT YOUR DRINKING: NO
HAVE YOU EVER FELT YOU SHOULD CUT DOWN ON YOUR DRINKING: NO
HAVE PEOPLE ANNOYED YOU BY CRITICIZING YOUR DRINKING: NO

## 2025-05-09 ASSESSMENT — PAIN DESCRIPTION - DESCRIPTORS: DESCRIPTORS: ACHING

## 2025-05-09 ASSESSMENT — PAIN SCALES - GENERAL: PAINLEVEL_OUTOF10: 6

## 2025-05-09 ASSESSMENT — PAIN DESCRIPTION - PAIN TYPE: TYPE: ACUTE PAIN

## 2025-05-09 NOTE — Clinical Note
Silvia Tamayo was seen and treated in our emergency department on 5/9/2025.  She may return to work on 05/12/2025.       If you have any questions or concerns, please don't hesitate to call.      Junaid Hagen PA-C

## 2025-05-09 NOTE — ED TRIAGE NOTES
Pt presents to the ED c/o headache and generalized body aches that started when she woke up. Pt denies CP/SOB/congestion/runny nose.

## 2025-05-09 NOTE — DISCHARGE INSTRUCTIONS
You were seen in the emergency department for evaluation of bodyaches and headache.  This may be beginning stages of a viral illness.    You were prescribed Tylenol.    Please follow-up with your primary care provider.  If you do not have a primary care provider you can call [phone] to make an appointment.    Please do not hesitate to return to the ED if symptoms change or worsen.  If you have symptoms like fevers that do not go down with Tylenol or trouble breathing please return to the ER immediately.

## 2025-05-09 NOTE — ED PROVIDER NOTES
HPI   Chief Complaint   Patient presents with   • Flu Symptoms       HPI  Patient is a 40-year-old otherwise healthy female that presents to the ED for evaluation of bodyaches and headache.  Patient reports she had 1 episode of nonbloody emesis last night.  Woke up this morning with bodyaches and a headache.  Denies drug or alcohol use.  Reports she works around a lot of people and could have had a sick contact.  Endorses some chills.  No fevers, shortness of breath, chest pain, diarrhea, blurry vision, double vision, rhinorrhea or congestion.  She has not taken anything for symptoms at home today. Would like a work note.      Patient History   Medical History[1]  Surgical History[2]  Family History[3]  Social History[4]    Physical Exam   ED Triage Vitals   Temp Pulse Resp BP   -- -- -- --      SpO2 Temp src Heart Rate Source Patient Position   -- -- -- --      BP Location FiO2 (%)     -- --       Physical Exam  Vitals reviewed.   Constitutional:       General: She is not in acute distress.     Appearance: Normal appearance. She is normal weight. She is not ill-appearing or toxic-appearing.   HENT:      Head: Normocephalic and atraumatic.      Right Ear: Tympanic membrane, ear canal and external ear normal.      Left Ear: Tympanic membrane, ear canal and external ear normal.      Nose: Nose normal. No congestion or rhinorrhea.      Mouth/Throat:      Mouth: Mucous membranes are moist.      Pharynx: Oropharynx is clear. No oropharyngeal exudate or posterior oropharyngeal erythema.   Eyes:      General: No scleral icterus.        Right eye: No discharge.         Left eye: No discharge.      Conjunctiva/sclera: Conjunctivae normal.      Pupils: Pupils are equal, round, and reactive to light.   Cardiovascular:      Rate and Rhythm: Normal rate and regular rhythm.      Pulses: Normal pulses.      Heart sounds: Normal heart sounds. No murmur heard.  Pulmonary:      Effort: Pulmonary effort is normal. No respiratory  distress.      Breath sounds: Normal breath sounds. No stridor. No wheezing or rhonchi.   Musculoskeletal:      Cervical back: Neck supple. No tenderness.   Skin:     General: Skin is warm and dry.      Capillary Refill: Capillary refill takes less than 2 seconds.      Coloration: Skin is not jaundiced or pale.   Neurological:      General: No focal deficit present.      Mental Status: She is alert and oriented to person, place, and time.   Psychiatric:         Mood and Affect: Mood normal.         Behavior: Behavior normal.           ED Course & MDM   Diagnoses as of 05/09/25 0837   Body aches                 No data recorded     Giles Coma Scale Score: 15 (05/09/25 0827 : Mandy Burgess, NANCY)                           Medical Decision Making  Patient is a 40-year-old otherwise healthy female that presents to the ED for evaluation of bodyaches and headache.  On exam patient is well-appearing and in no acute distress.  Vital signs are stable.  Cardiopulmonary exam largely unremarkable.  ENT exam is normal.  Differential includes but is not limited to URI, tension headache.  Lower suspicion for pneumonia given clear breath sounds bilaterally, not hypoxic, no shortness of breath sensation.  Patient is hypertensive on arrival at 159/107.  No red flag symptoms of hypertension today, she will get a referral to PCP for follow-up.    Social determinants of health affecting care:  None     Patient was informed of the aforementioned findings.  Symptoms are felt to be due to URI.  Patient will be prescribed Tylenol.     At this time, low suspicion for an acute process that would necessitate further emergent workup, urgent specialist consultation, or hospitalization.  Based on clinical workup, the disposition of discharge is appropriate.  Advised patient to pursue close follow-up with PCP.  Patient was provided with appropriate information to assist in obtaining the proper follow-up.  Discussed strict return to ED protocols  with patient, including low threshold to return for changing/worsening symptoms.  Patient demonstrated understanding and agreement with the plan of care, and all questions answered prior to discharge.  Patient stable at time of discharge.     --------------------------------------------------------------------------------------------------------------------------------------------------------------------------------------------------------------------------    This note was dictated using a speech recognition program.  While an attempt was made at proof reading to minimize errors, minor errors in transcription may be present call for questions.     Procedure  Procedures         [1]  History reviewed. No pertinent past medical history.  [2]  History reviewed. No pertinent surgical history.  [3]  No family history on file.  [4]  Social History  Tobacco Use   • Smoking status: Never   • Smokeless tobacco: Never   Substance Use Topics   • Alcohol use: Not on file   • Drug use: Not on file      Junaid Hagen PA-C  05/09/25 0839